# Patient Record
Sex: MALE | Race: WHITE
[De-identification: names, ages, dates, MRNs, and addresses within clinical notes are randomized per-mention and may not be internally consistent; named-entity substitution may affect disease eponyms.]

---

## 2021-08-15 ENCOUNTER — TRANSCRIPTION ENCOUNTER (OUTPATIENT)
Age: 36
End: 2021-08-15

## 2022-02-26 ENCOUNTER — TRANSCRIPTION ENCOUNTER (OUTPATIENT)
Age: 37
End: 2022-02-26

## 2022-03-01 ENCOUNTER — NON-APPOINTMENT (OUTPATIENT)
Age: 37
End: 2022-03-01

## 2022-03-29 ENCOUNTER — APPOINTMENT (OUTPATIENT)
Dept: FAMILY MEDICINE | Facility: CLINIC | Age: 37
End: 2022-03-29
Payer: COMMERCIAL

## 2022-03-29 ENCOUNTER — TRANSCRIPTION ENCOUNTER (OUTPATIENT)
Age: 37
End: 2022-03-29

## 2022-03-29 ENCOUNTER — NON-APPOINTMENT (OUTPATIENT)
Age: 37
End: 2022-03-29

## 2022-03-29 VITALS
TEMPERATURE: 97.1 F | OXYGEN SATURATION: 97 % | DIASTOLIC BLOOD PRESSURE: 78 MMHG | SYSTOLIC BLOOD PRESSURE: 125 MMHG | BODY MASS INDEX: 24.38 KG/M2 | HEART RATE: 99 BPM | RESPIRATION RATE: 12 BRPM | HEIGHT: 74 IN | HEIGHT: 71 IN | WEIGHT: 190 LBS

## 2022-03-29 DIAGNOSIS — Z72.89 OTHER PROBLEMS RELATED TO LIFESTYLE: ICD-10-CM

## 2022-03-29 DIAGNOSIS — Z82.49 FAMILY HISTORY OF ISCHEMIC HEART DISEASE AND OTHER DISEASES OF THE CIRCULATORY SYSTEM: ICD-10-CM

## 2022-03-29 DIAGNOSIS — Z81.8 FAMILY HISTORY OF OTHER MENTAL AND BEHAVIORAL DISORDERS: ICD-10-CM

## 2022-03-29 DIAGNOSIS — Z82.69 FAMILY HISTORY OF OTHER DISEASES OF THE MUSCULOSKELETAL SYSTEM AND CONNECTIVE TISSUE: ICD-10-CM

## 2022-03-29 PROCEDURE — 99395 PREV VISIT EST AGE 18-39: CPT

## 2022-03-29 NOTE — HISTORY OF PRESENT ILLNESS
[FreeTextEntry1] : Establish care  [de-identified] : 36 y/o recurrent sinus congestion was given augmenting for sinus infection got better but now still more congestion improved with Augmentin but did not completely resolved.  Mid February treated and has not resolved still with sx no exposures h/o trauma no fevers \par Pt denies CP SOB palpitations dizziness no GI  symptoms doing well

## 2022-03-29 NOTE — HEALTH RISK ASSESSMENT
[Very Good] : ~his/her~ current health as very good [Good] : ~his/her~  mood as  good [Never] : Never [Yes] : Yes [0] : 2) Feeling down, depressed, or hopeless: Not at all (0) [HIV test declined] : HIV test declined [Hepatitis C test declined] : Hepatitis C test declined [None] : None [With Family] : lives with family [Graduate School] : graduate school [] :  [# Of Children ___] : has [unfilled] children [Sexually Active] : sexually active [Feels Safe at Home] : Feels safe at home [Fully functional (bathing, dressing, toileting, transferring, walking, feeding)] : Fully functional (bathing, dressing, toileting, transferring, walking, feeding) [Fully functional (using the telephone, shopping, preparing meals, housekeeping, doing laundry, using] : Fully functional and needs no help or supervision to perform IADLs (using the telephone, shopping, preparing meals, housekeeping, doing laundry, using transportation, managing medications and managing finances) [Smoke Detector] : smoke detector [Carbon Monoxide Detector] : carbon monoxide detector [Safety elements used in home] : safety elements used in home [Seat Belt] :  uses seat belt [Sunscreen] : uses sunscreen [Name: ___] : Health Care Proxy's Name: [unfilled]  [Relationship: ___] : Relationship: [unfilled] [I will adhere to the patient's wishes.] : I will adhere to the patient's wishes. 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

## 2022-03-29 NOTE — PHYSICAL EXAM
[No Acute Distress] : no acute distress [Well Nourished] : well nourished [Well Developed] : well developed [Well-Appearing] : well-appearing [Normal Sclera/Conjunctiva] : normal sclera/conjunctiva [PERRL] : pupils equal round and reactive to light [EOMI] : extraocular movements intact [Normal Outer Ear/Nose] : the outer ears and nose were normal in appearance [Normal Oropharynx] : the oropharynx was normal [Normal TMs] : both tympanic membranes were normal [No JVD] : no jugular venous distention [No Lymphadenopathy] : no lymphadenopathy [Supple] : supple [Thyroid Normal, No Nodules] : the thyroid was normal and there were no nodules present [No Respiratory Distress] : no respiratory distress  [No Accessory Muscle Use] : no accessory muscle use [Clear to Auscultation] : lungs were clear to auscultation bilaterally [Normal Rate] : normal rate  [Regular Rhythm] : with a regular rhythm [Normal S1, S2] : normal S1 and S2 [No Murmur] : no murmur heard [No Carotid Bruits] : no carotid bruits [No Abdominal Bruit] : a ~M bruit was not heard ~T in the abdomen [No Varicosities] : no varicosities [Pedal Pulses Present] : the pedal pulses are present [No Edema] : there was no peripheral edema [No Palpable Aorta] : no palpable aorta [No Extremity Clubbing/Cyanosis] : no extremity clubbing/cyanosis [Soft] : abdomen soft [Non Tender] : non-tender [Non-distended] : non-distended [No Masses] : no abdominal mass palpated [No HSM] : no HSM [Normal Bowel Sounds] : normal bowel sounds [Normal Posterior Cervical Nodes] : no posterior cervical lymphadenopathy [Normal Anterior Cervical Nodes] : no anterior cervical lymphadenopathy [No CVA Tenderness] : no CVA  tenderness [No Spinal Tenderness] : no spinal tenderness [No Joint Swelling] : no joint swelling [Grossly Normal Strength/Tone] : grossly normal strength/tone [No Rash] : no rash [Coordination Grossly Intact] : coordination grossly intact [No Focal Deficits] : no focal deficits [Normal Gait] : normal gait [Deep Tendon Reflexes (DTR)] : deep tendon reflexes were 2+ and symmetric [Normal Affect] : the affect was normal [Normal Insight/Judgement] : insight and judgment were intact [de-identified] : boggy nasal mucosae R?L with clear nasal discharge

## 2022-03-29 NOTE — ASSESSMENT
[FreeTextEntry1] : d/w pt nasal saline continue OTC Flonase\par added z pack  steroid \par consider ENT if not improving

## 2022-03-29 NOTE — REVIEW OF SYSTEMS
[Hearing Loss] : no hearing loss [Postnasal Drip] : postnasal drip [Nasal Discharge] : nasal discharge [Sore Throat] : no sore throat [FreeTextEntry4] : nasal congestion and sinus pressure [FreeTextEntry9] : scoliosis

## 2022-04-20 ENCOUNTER — TRANSCRIPTION ENCOUNTER (OUTPATIENT)
Age: 37
End: 2022-04-20

## 2022-04-25 LAB
25(OH)D3 SERPL-MCNC: 33.5 NG/ML
ALBUMIN SERPL ELPH-MCNC: 5 G/DL
ALP BLD-CCNC: 84 U/L
ALT SERPL-CCNC: 16 U/L
ANION GAP SERPL CALC-SCNC: 12 MMOL/L
AST SERPL-CCNC: 23 U/L
BILIRUB SERPL-MCNC: 0.6 MG/DL
BUN SERPL-MCNC: 15 MG/DL
CALCIUM SERPL-MCNC: 9.7 MG/DL
CHLORIDE SERPL-SCNC: 104 MMOL/L
CHOLEST SERPL-MCNC: 222 MG/DL
CO2 SERPL-SCNC: 27 MMOL/L
CREAT SERPL-MCNC: 0.98 MG/DL
EGFR: 103 ML/MIN/1.73M2
GLUCOSE SERPL-MCNC: 118 MG/DL
HDLC SERPL-MCNC: 67 MG/DL
LDLC SERPL CALC-MCNC: 137 MG/DL
NONHDLC SERPL-MCNC: 155 MG/DL
POTASSIUM SERPL-SCNC: 4.7 MMOL/L
PROT SERPL-MCNC: 7.6 G/DL
SODIUM SERPL-SCNC: 143 MMOL/L
TRIGL SERPL-MCNC: 93 MG/DL
TSH SERPL-ACNC: 2.27 UIU/ML

## 2022-05-03 ENCOUNTER — APPOINTMENT (OUTPATIENT)
Dept: OTOLARYNGOLOGY | Facility: CLINIC | Age: 37
End: 2022-05-03
Payer: COMMERCIAL

## 2022-05-03 VITALS
HEIGHT: 72 IN | WEIGHT: 185 LBS | DIASTOLIC BLOOD PRESSURE: 73 MMHG | SYSTOLIC BLOOD PRESSURE: 134 MMHG | BODY MASS INDEX: 25.06 KG/M2 | HEART RATE: 80 BPM

## 2022-05-03 DIAGNOSIS — Z78.9 OTHER SPECIFIED HEALTH STATUS: ICD-10-CM

## 2022-05-03 DIAGNOSIS — R09.82 POSTNASAL DRIP: ICD-10-CM

## 2022-05-03 DIAGNOSIS — R09.81 NASAL CONGESTION: ICD-10-CM

## 2022-05-03 DIAGNOSIS — J34.89 OTHER SPECIFIED DISORDERS OF NOSE AND NASAL SINUSES: ICD-10-CM

## 2022-05-03 PROCEDURE — 99203 OFFICE O/P NEW LOW 30 MIN: CPT

## 2022-05-03 RX ORDER — AZITHROMYCIN 250 MG/1
250 TABLET, FILM COATED ORAL
Qty: 1 | Refills: 0 | Status: COMPLETED | COMMUNITY
Start: 2022-03-29 | End: 2022-05-03

## 2022-05-03 RX ORDER — METHYLPREDNISOLONE 4 MG/1
4 TABLET ORAL
Qty: 1 | Refills: 0 | Status: COMPLETED | COMMUNITY
Start: 2022-03-29 | End: 2022-05-03

## 2022-05-03 NOTE — PHYSICAL EXAM
[] : septum deviated to the right [de-identified] : Pale and edematous [Midline] : trachea located in midline position [Normal] : no rashes

## 2022-05-03 NOTE — REASON FOR VISIT
[Initial Evaluation] : an initial evaluation for [FreeTextEntry2] : here for nasal congestion, sinus pain, sinus pressure, post nasal drip, discolored nasal discharge

## 2022-05-03 NOTE — HISTORY OF PRESENT ILLNESS
[de-identified] : 37 year old male here for nasal congestion, sinus pain, sinus pressure, post nasal drip, discolored nasal discharge.   has had symptoms since February, 2022.  has been on multiple antibiotics, including Augmetin and a z-hiren.  He also took a medrol dose hiren and symptoms continue to return.   has been using Flonase intermittently with no relief.   Denies cat scan of sinuses.

## 2022-05-03 NOTE — CONSULT LETTER
[Dear  ___] : Dear  [unfilled], [Please see my note below.] : Please see my note below. [Sincerely,] : Sincerely, [Consult Letter:] : I had the pleasure of evaluating your patient, [unfilled]. [FreeTextEntry2] : Radha Browne MD ([facility] physician)\par  [FreeTextEntry3] : Adam Vidal MD, FACS\par Chief of Otolaryngology at [facility]\par \par Dept. of Otolaryngology\par [facility]\par

## 2022-05-03 NOTE — ASSESSMENT
[FreeTextEntry1] : Pt's findings are highly suggestive of allergies. Trial of Claritin and Fluticasone spray recommended. \par \par Will get sinus CT if not better in 2 weeks.

## 2022-05-10 ENCOUNTER — NON-APPOINTMENT (OUTPATIENT)
Age: 37
End: 2022-05-10

## 2022-06-16 ENCOUNTER — NON-APPOINTMENT (OUTPATIENT)
Age: 37
End: 2022-06-16

## 2022-06-21 ENCOUNTER — OUTPATIENT (OUTPATIENT)
Dept: OUTPATIENT SERVICES | Facility: HOSPITAL | Age: 37
LOS: 1 days | End: 2022-06-21
Payer: COMMERCIAL

## 2022-06-21 ENCOUNTER — APPOINTMENT (OUTPATIENT)
Dept: CT IMAGING | Facility: HOSPITAL | Age: 37
End: 2022-06-21
Payer: COMMERCIAL

## 2022-06-21 DIAGNOSIS — R09.81 NASAL CONGESTION: ICD-10-CM

## 2022-06-21 PROCEDURE — 70486 CT MAXILLOFACIAL W/O DYE: CPT

## 2022-06-21 PROCEDURE — 70486 CT MAXILLOFACIAL W/O DYE: CPT | Mod: 26

## 2022-07-11 ENCOUNTER — TRANSCRIPTION ENCOUNTER (OUTPATIENT)
Age: 37
End: 2022-07-11

## 2022-07-19 ENCOUNTER — APPOINTMENT (OUTPATIENT)
Dept: OTOLARYNGOLOGY | Facility: CLINIC | Age: 37
End: 2022-07-19

## 2022-07-19 VITALS
HEIGHT: 71 IN | BODY MASS INDEX: 26.18 KG/M2 | DIASTOLIC BLOOD PRESSURE: 73 MMHG | HEART RATE: 86 BPM | WEIGHT: 187 LBS | SYSTOLIC BLOOD PRESSURE: 128 MMHG

## 2022-07-19 PROCEDURE — 99213 OFFICE O/P EST LOW 20 MIN: CPT

## 2022-07-19 RX ORDER — FLUTICASONE PROPIONATE 50 UG/1
50 SPRAY, METERED NASAL DAILY
Qty: 1 | Refills: 11 | Status: COMPLETED | COMMUNITY
Start: 2022-05-03 | End: 2022-07-19

## 2022-07-19 NOTE — CONSULT LETTER
[Dear  ___] : Dear  [unfilled], [Courtesy Letter:] : I had the pleasure of seeing your patient, [unfilled], in my office today. [Please see my note below.] : Please see my note below. [Sincerely,] : Sincerely, [FreeTextEntry2] : Radha Browne MD ([facility]) [FreeTextEntry3] : Adam Vidal MD, FACS\par Chief of Otolaryngology at [facility]\par \par Dept. of Otolaryngology\par [facility]\par

## 2022-07-19 NOTE — DATA REVIEWED
[de-identified] : \par   CT Sinuses No Cont             Final\par \par No Documents Attached\par \par \par \par \par   ACC: [id number]     EXAM:  CT SINUSES\par \par PROCEDURE DATE:  06/21/2022\par \par \par \par INTERPRETATION:  CLINICAL INFORMATION: Sinus congestion and discomfort.  No improvement after 3-4 weeks of treatment with Flonase.  R09.81.  J34.89.\par \par TECHNIQUE:\par Noncontrast axial CT images were acquired through the paranasal sinuses\par Sagittal and coronal reformats were generated.\par \par \par COMPARISON STUDY: None.\par \par FINDINGS:\par Postsurgical or posttraumatic changes: None.\par \par Frontal sinuses: Mild mucosal thickening in the frontal sinuses bilaterally, without air-fluid level.  Mucosal thickening opacifies the frontal recesses bilaterally.\par \par Maxillary sinuses: Mild to moderate mucosal thickening bilaterally.  Mucous retention cysts in the right maxillary sinus measure up to approximately 1.5 cm (14:65) a mucous retention cyst versus polyp in the left maxillary sinus measures approximately 1.1 cm (14:86).  Mucosal thickening opacifies the maxillary sinus ostium/infundibulum bilaterally.\par \par Ethmoid air cells: Extensive opacification of the ethmoid air cells bilaterally.  Several air-fluid levels in the left anterior ethmoid air cells.  Trace, wispy secretions in the right posterior ethmoid air cell.  Opacification of the left posterior ethmoid air cell.\par \par Sphenoid sinuses: Mild mucosal thickening in the sphenoid sinuses bilaterally.  Small air-fluid levels in both sphenoid sinuses.  The sphenoethmoidal recesses are opacified by mucosal thickening bilaterally.\par \par Nasal septum: Mild leftward deviation of the nasal septum with small left-sided bony spurs that minimally contacts and deforms the left inferior nasal turbinate.\par \par Nasal cavity: Partial opacification of the superior and middle meati bilaterally which appears to be due to mucosal thickening.  No underlying polyp or mass is visualized by CT technique.\par \par Mastoids: Well-developed mastoids are clear bilaterally.  Moderate amount of cerumen in the right external auditory canal.\par \par Orbits: Within normal limits.\par \par Visualized pharynx: The nasopharynx and this was portions of the oropharynx appear within normal limits.\par \par \par IMPRESSION:\par Diffuse paranasal sinus mucosal thickening.  Mucosal thickening opacifies the frontal recesses, maxillary sinus ostia/infundibula, and sphenoethmoidal recesses bilaterally.  Air-fluid levels within several left ethmoid air cells and both sphenoid sinuses may represent trapped secretions versus sinusitis.  No underlying mass or polyp is identified in the nasal cavity.\par \par --- End of Report ---\par \par \par \par \par \par GALILEO JHAVERI MD; Attending Radiologist\par This document has been electronically signed. Jun 27 2022 10:56PM\par \par  \par \par  Ordered by: ROHINI LAW       Collected/Examined: 21Jun2022 03:27PM       \par Verification Required       Stage: Final       \par  Performed at: [facility] [place]       Resulted: [id number] 10:40PM       Last Updated: [id number] 01:11PM       Accession: [id number]

## 2022-07-19 NOTE — ASSESSMENT
[FreeTextEntry1] : Sinus CT reviewed with pt in detail.  \par \par Pt to start Levaquin if not 100% after Augmentin.  Potential risks of Quinolones discussed with patient including, but not limited to, tendon rupture, drowsiness, CNS side effects, and neuromuscular dysfunction.\par \par \par If pt continues to have symptoms after Levaquin he will need FESS.

## 2022-07-19 NOTE — REASON FOR VISIT
[Subsequent Evaluation] : a subsequent evaluation for [FreeTextEntry2] : follow up after cat scan of sinuses

## 2022-07-19 NOTE — HISTORY OF PRESENT ILLNESS
[de-identified] : 37 year old male follow up after cat scan of sinuses 6/21/22\par States used Flonase as prescribed with no improvement in symptoms and has since stopped using it.  States has been taking the Augmentin as prescribed and feels better but not fully.  States symptoms have improved.  States still having rhinorrhea and productive cough.  \par

## 2022-08-05 ENCOUNTER — TRANSCRIPTION ENCOUNTER (OUTPATIENT)
Age: 37
End: 2022-08-05

## 2022-11-24 ENCOUNTER — NON-APPOINTMENT (OUTPATIENT)
Age: 37
End: 2022-11-24

## 2023-05-24 ENCOUNTER — APPOINTMENT (OUTPATIENT)
Dept: CT IMAGING | Facility: HOSPITAL | Age: 38
End: 2023-05-24
Payer: COMMERCIAL

## 2023-05-24 ENCOUNTER — OUTPATIENT (OUTPATIENT)
Dept: OUTPATIENT SERVICES | Facility: HOSPITAL | Age: 38
LOS: 1 days | End: 2023-05-24
Payer: COMMERCIAL

## 2023-05-24 DIAGNOSIS — J34.2 DEVIATED NASAL SEPTUM: ICD-10-CM

## 2023-05-24 PROCEDURE — 70486 CT MAXILLOFACIAL W/O DYE: CPT | Mod: 26

## 2023-05-24 PROCEDURE — 70486 CT MAXILLOFACIAL W/O DYE: CPT

## 2023-06-28 ENCOUNTER — APPOINTMENT (OUTPATIENT)
Dept: FAMILY MEDICINE | Facility: CLINIC | Age: 38
End: 2023-06-28
Payer: COMMERCIAL

## 2023-06-28 VITALS
WEIGHT: 187 LBS | RESPIRATION RATE: 15 BRPM | TEMPERATURE: 97.9 F | DIASTOLIC BLOOD PRESSURE: 74 MMHG | HEART RATE: 82 BPM | SYSTOLIC BLOOD PRESSURE: 121 MMHG | OXYGEN SATURATION: 97 % | BODY MASS INDEX: 26.08 KG/M2

## 2023-06-28 DIAGNOSIS — Z87.09 PERSONAL HISTORY OF OTHER DISEASES OF THE RESPIRATORY SYSTEM: ICD-10-CM

## 2023-06-28 DIAGNOSIS — E78.00 PURE HYPERCHOLESTEROLEMIA, UNSPECIFIED: ICD-10-CM

## 2023-06-28 DIAGNOSIS — J32.4 CHRONIC PANSINUSITIS: ICD-10-CM

## 2023-06-28 DIAGNOSIS — Z00.00 ENCOUNTER FOR GENERAL ADULT MEDICAL EXAMINATION W/OUT ABNORMAL FINDINGS: ICD-10-CM

## 2023-06-28 DIAGNOSIS — J34.89 OTHER SPECIFIED DISORDERS OF NOSE AND NASAL SINUSES: ICD-10-CM

## 2023-06-28 PROCEDURE — 99395 PREV VISIT EST AGE 18-39: CPT

## 2023-06-28 RX ORDER — LEVOFLOXACIN 500 MG/1
500 TABLET, FILM COATED ORAL DAILY
Qty: 21 | Refills: 0 | Status: COMPLETED | COMMUNITY
Start: 2022-07-19 | End: 2023-06-28

## 2023-06-28 RX ORDER — AMOXICILLIN AND CLAVULANATE POTASSIUM 875; 125 MG/1; MG/1
875-125 TABLET, COATED ORAL
Qty: 20 | Refills: 0 | Status: COMPLETED | COMMUNITY
Start: 2022-07-11 | End: 2023-06-28

## 2023-06-28 NOTE — HEALTH RISK ASSESSMENT
[Very Good] : ~his/her~  mood as very good [Yes] : Yes [No falls in past year] : Patient reported no falls in the past year [0] : 2) Feeling down, depressed, or hopeless: Not at all (0) [HIV test declined] : HIV test declined [Hepatitis C test declined] : Hepatitis C test declined [With Family] : lives with family [Employed] : employed [] :  [# Of Children ___] : has [unfilled] children [Sexually Active] : sexually active [Fully functional (bathing, dressing, toileting, transferring, walking, feeding)] : Fully functional (bathing, dressing, toileting, transferring, walking, feeding) [Fully functional (using the telephone, shopping, preparing meals, housekeeping, doing laundry, using] : Fully functional and needs no help or supervision to perform IADLs (using the telephone, shopping, preparing meals, housekeeping, doing laundry, using transportation, managing medications and managing finances) [Smoke Detector] : smoke detector [Carbon Monoxide Detector] : carbon monoxide detector [Safety elements used in home] : safety elements used in home [Seat Belt] :  uses seat belt [Sunscreen] : uses sunscreen [Name: ___] : Health Care Proxy's Name: [unfilled]  [Relationship: ___] : Relationship: [unfilled] [FreeTextEntry1] : sinus issues [de-identified] : none [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

## 2023-06-28 NOTE — HISTORY OF PRESENT ILLNESS
[de-identified] : 39 y/o n PMHX allergic Rhinitis h/o setoplasty and feels nasal congestion coming back still on Flonase  doing ok but has f/u different ENT. Pt

## 2023-07-10 ENCOUNTER — TRANSCRIPTION ENCOUNTER (OUTPATIENT)
Age: 38
End: 2023-07-10

## 2023-07-10 LAB
ALBUMIN SERPL ELPH-MCNC: 4.7 G/DL
ALP BLD-CCNC: 74 U/L
ALT SERPL-CCNC: 15 U/L
ANION GAP SERPL CALC-SCNC: 10 MMOL/L
AST SERPL-CCNC: 19 U/L
BILIRUB SERPL-MCNC: 0.7 MG/DL
BUN SERPL-MCNC: 15 MG/DL
CALCIUM SERPL-MCNC: 9.4 MG/DL
CHLORIDE SERPL-SCNC: 106 MMOL/L
CHOLEST SERPL-MCNC: 209 MG/DL
CO2 SERPL-SCNC: 27 MMOL/L
CREAT SERPL-MCNC: 1.04 MG/DL
EGFR: 95 ML/MIN/1.73M2
GLUCOSE SERPL-MCNC: 101 MG/DL
HDLC SERPL-MCNC: 65 MG/DL
LDLC SERPL CALC-MCNC: 129 MG/DL
NONHDLC SERPL-MCNC: 144 MG/DL
POTASSIUM SERPL-SCNC: 4.8 MMOL/L
PROT SERPL-MCNC: 7.3 G/DL
SODIUM SERPL-SCNC: 144 MMOL/L
TRIGL SERPL-MCNC: 74 MG/DL

## 2024-08-21 ENCOUNTER — APPOINTMENT (OUTPATIENT)
Dept: FAMILY MEDICINE | Facility: CLINIC | Age: 39
End: 2024-08-21
Payer: COMMERCIAL

## 2024-08-21 VITALS
DIASTOLIC BLOOD PRESSURE: 82 MMHG | OXYGEN SATURATION: 97 % | TEMPERATURE: 98.3 F | BODY MASS INDEX: 26.78 KG/M2 | SYSTOLIC BLOOD PRESSURE: 130 MMHG | WEIGHT: 192 LBS | HEART RATE: 67 BPM | RESPIRATION RATE: 16 BRPM

## 2024-08-21 DIAGNOSIS — J32.4 CHRONIC PANSINUSITIS: ICD-10-CM

## 2024-08-21 DIAGNOSIS — E78.00 PURE HYPERCHOLESTEROLEMIA, UNSPECIFIED: ICD-10-CM

## 2024-08-21 DIAGNOSIS — R09.82 POSTNASAL DRIP: ICD-10-CM

## 2024-08-21 DIAGNOSIS — Z00.00 ENCOUNTER FOR GENERAL ADULT MEDICAL EXAMINATION W/OUT ABNORMAL FINDINGS: ICD-10-CM

## 2024-08-21 DIAGNOSIS — R73.09 OTHER ABNORMAL GLUCOSE: ICD-10-CM

## 2024-08-21 PROCEDURE — 99395 PREV VISIT EST AGE 18-39: CPT

## 2024-08-22 NOTE — HISTORY OF PRESENT ILLNESS
[FreeTextEntry1] : CPE [de-identified] : 40 y/o PMHX Elevated lipid and glucose here for annual CPE.  Patient notes is doing well still issues chronic sinus pressure and states nothing seems to work

## 2024-08-22 NOTE — HEALTH RISK ASSESSMENT
[Very Good] : ~his/her~  mood as very good [Yes] : Yes [No falls in past year] : Patient reported no falls in the past year [0] : 2) Feeling down, depressed, or hopeless: Not at all (0) [Never] : Never [NO] : No [HIV test declined] : HIV test declined [Hepatitis C test declined] : Hepatitis C test declined [None] : None [With Family] : lives with family [Employed] : employed [College] : College [] :  [# Of Children ___] : has [unfilled] children [Sexually Active] : sexually active [Feels Safe at Home] : Feels safe at home [Fully functional (bathing, dressing, toileting, transferring, walking, feeding)] : Fully functional (bathing, dressing, toileting, transferring, walking, feeding) [Fully functional (using the telephone, shopping, preparing meals, housekeeping, doing laundry, using] : Fully functional and needs no help or supervision to perform IADLs (using the telephone, shopping, preparing meals, housekeeping, doing laundry, using transportation, managing medications and managing finances) [Smoke Detector] : smoke detector [Safety elements used in home] : safety elements used in home [Seat Belt] :  uses seat belt [Sunscreen] : uses sunscreen [Reviewed no changes] : Reviewed, no changes [Name: ___] : Health Care Proxy's Name: [unfilled]  [Relationship: ___] : Relationship: [unfilled] [I will adhere to the patient's wishes.] : I will adhere to the patient's wishes. 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

## 2024-08-22 NOTE — HEALTH RISK ASSESSMENT
[Very Good] : ~his/her~  mood as very good [Yes] : Yes [No falls in past year] : Patient reported no falls in the past year [0] : 2) Feeling down, depressed, or hopeless: Not at all (0) [Never] : Never [NO] : No [HIV test declined] : HIV test declined [Hepatitis C test declined] : Hepatitis C test declined [None] : None [With Family] : lives with family [Employed] : employed [College] : College [] :  [# Of Children ___] : has [unfilled] children [Sexually Active] : sexually active [Feels Safe at Home] : Feels safe at home [Fully functional (bathing, dressing, toileting, transferring, walking, feeding)] : Fully functional (bathing, dressing, toileting, transferring, walking, feeding) [Fully functional (using the telephone, shopping, preparing meals, housekeeping, doing laundry, using] : Fully functional and needs no help or supervision to perform IADLs (using the telephone, shopping, preparing meals, housekeeping, doing laundry, using transportation, managing medications and managing finances) [Smoke Detector] : smoke detector [Safety elements used in home] : safety elements used in home [Seat Belt] :  uses seat belt [Sunscreen] : uses sunscreen [Reviewed no changes] : Reviewed, no changes [Name: ___] : Health Care Proxy's Name: [unfilled]  [Relationship: ___] : Relationship: [unfilled] [I will adhere to the patient's wishes.] : I will adhere to the patient's wishes. 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

## 2024-08-22 NOTE — HISTORY OF PRESENT ILLNESS
[FreeTextEntry1] : CPE [de-identified] : 40 y/o PMHX Elevated lipid and glucose here for annual CPE.  Patient notes is doing well still issues chronic sinus pressure and states nothing seems to work

## 2024-08-26 LAB
ALBUMIN SERPL ELPH-MCNC: 4.6 G/DL
ALP BLD-CCNC: 77 U/L
ALT SERPL-CCNC: 14 U/L
ANION GAP SERPL CALC-SCNC: 11 MMOL/L
AST SERPL-CCNC: 22 U/L
BILIRUB SERPL-MCNC: 0.4 MG/DL
BUN SERPL-MCNC: 14 MG/DL
CALCIUM SERPL-MCNC: 9.4 MG/DL
CHLORIDE SERPL-SCNC: 103 MMOL/L
CHOLEST SERPL-MCNC: 193 MG/DL
CO2 SERPL-SCNC: 27 MMOL/L
CREAT SERPL-MCNC: 1 MG/DL
EGFR: 99 ML/MIN/1.73M2
ESTIMATED AVERAGE GLUCOSE: 114 MG/DL
GLUCOSE SERPL-MCNC: 100 MG/DL
HBA1C MFR BLD HPLC: 5.6 %
HDLC SERPL-MCNC: 62 MG/DL
LDLC SERPL CALC-MCNC: 121 MG/DL
NONHDLC SERPL-MCNC: 132 MG/DL
POTASSIUM SERPL-SCNC: 4.7 MMOL/L
PROT SERPL-MCNC: 7 G/DL
SODIUM SERPL-SCNC: 141 MMOL/L
TRIGL SERPL-MCNC: 58 MG/DL